# Patient Record
Sex: FEMALE | Race: WHITE | NOT HISPANIC OR LATINO | Employment: OTHER | URBAN - METROPOLITAN AREA
[De-identification: names, ages, dates, MRNs, and addresses within clinical notes are randomized per-mention and may not be internally consistent; named-entity substitution may affect disease eponyms.]

---

## 2020-02-11 ENCOUNTER — OFFICE VISIT (OUTPATIENT)
Dept: URGENT CARE | Facility: CLINIC | Age: 85
End: 2020-02-11
Payer: MEDICARE

## 2020-02-11 VITALS
HEART RATE: 75 BPM | OXYGEN SATURATION: 98 % | SYSTOLIC BLOOD PRESSURE: 170 MMHG | RESPIRATION RATE: 19 BRPM | DIASTOLIC BLOOD PRESSURE: 85 MMHG | HEIGHT: 67 IN | WEIGHT: 150 LBS | BODY MASS INDEX: 23.54 KG/M2 | TEMPERATURE: 97 F

## 2020-02-11 DIAGNOSIS — T24.209A SUPERFICIAL PARTIAL THICKNESS BURN OF LOWER EXTREMITY: Primary | ICD-10-CM

## 2020-02-11 PROCEDURE — 99214 OFFICE O/P EST MOD 30 MIN: CPT | Mod: S$GLB,,, | Performed by: PHYSICIAN ASSISTANT

## 2020-02-11 PROCEDURE — 99214 PR OFFICE/OUTPT VISIT, EST, LEVL IV, 30-39 MIN: ICD-10-PCS | Mod: S$GLB,,, | Performed by: PHYSICIAN ASSISTANT

## 2020-02-11 RX ORDER — MUPIROCIN 20 MG/G
OINTMENT TOPICAL
Qty: 30 G | Refills: 3 | Status: SHIPPED | OUTPATIENT
Start: 2020-02-11

## 2020-02-11 RX ORDER — LEVOTHYROXINE SODIUM 125 UG/1
CAPSULE ORAL
COMMUNITY
Start: 2013-11-04

## 2020-02-11 RX ORDER — GLIMEPIRIDE 4 MG/1
TABLET ORAL
COMMUNITY
Start: 2019-12-11

## 2020-02-11 RX ORDER — UBIDECARENONE 75 MG
500 CAPSULE ORAL
COMMUNITY

## 2020-02-11 RX ORDER — INSULIN DEGLUDEC 100 U/ML
INJECTION, SOLUTION SUBCUTANEOUS
COMMUNITY

## 2020-02-11 RX ORDER — NATEGLINIDE 120 MG/1
TABLET ORAL
COMMUNITY

## 2020-02-11 NOTE — PATIENT INSTRUCTIONS
Follow up with primary care this week  Clean daily with cool water and mild soap  To remove dressing - let water rush over the area to assist in removal of dressing  Cover daily with bactroban or aloe vera    If you cover the area, cover with nonadhesive gauze    Please return here or go to the Emergency Department for any concerns or worsening of condition.  If you were prescribed antibiotics, please take them to completion.  If you were prescribed a narcotic medication, do not drive or operate heavy equipment or machinery while taking these medications.  Please follow up with your primary care doctor or specialist as needed.    If you  smoke, please stop smoking.      Hot Water Burn  Hot water on the skin can cause a first- or second-degree burn. A first-degree burn causes only redness and heals in a few days. A second-degree burn is deeper. It causes a blister to form. The blister may break and leak clear fluid. It may become infected. Second-degree burns take 1 to 2 weeks to heal.  Home care  The following guidelines will help you care for your burn at home:  · On the first day, put a small towel soaked in cool water on the area to ease severe pain.  · If no blister formed, you may use creams with benzocaine if the burn is painful. You may also use moisturizers with aloe vera.  · If a blister formed and broke and a bandage was applied, change it once a day, or as directed. If the bandage sticks, remove it by soaking it in warm water. Wash the burned area daily with soap and water. Pat dry with a clean towel. For the next 3 to 5 days, put an antibiotic cream or ointment on the area after washing. This will help to prevent an infection and to keep the bandage from sticking.  · If a blister formed, it will go down by itself. Or it will break on its own in the next few days. If the blister breaks, a clear fluid will leak from it for a day or two. The loose skin from the broken blister has no feeling.  You can  carefully trim away this skin with clean, small, sharp scissors. Sterilize by soaking in alcohol first. Or wash with soap and water. Wash the raw surface under the blister daily with soap and water. For the next 3 to 5 days, put an antibiotic cream or ointment on the area after washing. This will help prevent an infection and keep the bandage from sticking.  · You may use over-the-counter medicine to control pain, unless another pain medicine was prescribed. If you have chronic liver or kidney disease, talk with your healthcare provider before using acetaminophen or ibuprofen. Also talk with your provider if you've had a stomach ulcer or GI bleeding. Don't give ibuprofen to children younger than 6 months of age.  · Don't pick or scratch at the affected areas. Use over-the-counter medicine for itching.  · Wear a hat, sunscreen, and long sleeves while in the sun.  · Don't wear tight-fitting clothes.  · Add more calories and protein to your diet until the wound has healed. Drink plenty of water.  Follow-up care  Follow up with your healthcare provider, or as advised. Most hot water burns heal without becoming infected. Sometimes an infection happens even with proper treatment. You should watch for the signs of infection listed below.  When to seek medical advice  Call your healthcare provider right away if any of these occur:  · Pain that gets worse  · Redness or swelling that gets worse  · Pus coming from the wound  · Fever of 100.4º F (38º C) or higher, or as directed by your healthcare provider  · The wound doesn't seem to be healing  · Nausea or vomiting   Date Last Reviewed: 12/1/2016  © 4150-6360 "[employer]. [address] [place] [address], [place] [address]. All rights reserved. This information is not intended as a substitute for professional medical care. Always follow your healthcare professional's instructions.
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
## 2020-02-12 ENCOUNTER — TELEPHONE (OUTPATIENT)
Dept: URGENT CARE | Facility: CLINIC | Age: 85
End: 2020-02-12

## 2020-02-12 NOTE — TELEPHONE ENCOUNTER
Attempted to call pt to check on her. No answer, number not valid. Will attempt to fine accurate number